# Patient Record
Sex: FEMALE | Race: BLACK OR AFRICAN AMERICAN | NOT HISPANIC OR LATINO | Employment: OTHER | URBAN - METROPOLITAN AREA
[De-identification: names, ages, dates, MRNs, and addresses within clinical notes are randomized per-mention and may not be internally consistent; named-entity substitution may affect disease eponyms.]

---

## 2023-02-28 ENCOUNTER — TELEPHONE (OUTPATIENT)
Dept: SMOKING CESSATION | Facility: CLINIC | Age: 38
End: 2023-02-28
Payer: COMMERCIAL

## 2023-03-01 ENCOUNTER — TELEPHONE (OUTPATIENT)
Dept: SMOKING CESSATION | Facility: CLINIC | Age: 38
End: 2023-03-01

## 2023-03-06 ENCOUNTER — TELEPHONE (OUTPATIENT)
Dept: SMOKING CESSATION | Facility: CLINIC | Age: 38
End: 2023-03-06

## 2023-03-16 PROBLEM — F33.1 MDD (MAJOR DEPRESSIVE DISORDER), RECURRENT EPISODE, MODERATE: Status: ACTIVE | Noted: 2023-03-16

## 2023-03-16 PROBLEM — F43.10 PTSD (POST-TRAUMATIC STRESS DISORDER): Status: ACTIVE | Noted: 2023-03-16

## 2023-03-16 PROBLEM — F10.20 ALCOHOL USE DISORDER, SEVERE, DEPENDENCE: Status: ACTIVE | Noted: 2023-03-16

## 2023-03-16 PROBLEM — S06.9XAA TBI (TRAUMATIC BRAIN INJURY): Chronic | Status: ACTIVE | Noted: 2023-03-16

## 2023-03-16 PROBLEM — F60.3 BORDERLINE PERSONALITY DISORDER IN ADULT: Status: ACTIVE | Noted: 2023-03-16

## 2023-03-16 PROBLEM — R41.3 MEMORY CHANGE: Status: ACTIVE | Noted: 2023-03-16

## 2023-03-16 PROBLEM — F12.10 CANNABIS ABUSE: Status: ACTIVE | Noted: 2023-03-16

## 2023-03-20 ENCOUNTER — SOCIAL WORK (OUTPATIENT)
Dept: ADMINISTRATIVE | Facility: OTHER | Age: 38
End: 2023-03-20
Payer: COMMERCIAL

## 2023-03-21 ENCOUNTER — SOCIAL WORK (OUTPATIENT)
Dept: ADMINISTRATIVE | Facility: OTHER | Age: 38
End: 2023-03-21
Payer: COMMERCIAL

## 2023-03-21 NOTE — PROGRESS NOTES
An[facility] Dion[facility] Bowden, [facility] ([phone][facility] regarding the counseling referral. A message was left requesting an update.    Ximena Pedro LCSW    [phone]

## 2023-03-22 ENCOUNTER — SOCIAL WORK (OUTPATIENT)
Dept: ADMINISTRATIVE | Facility: OTHER | Age: 38
End: 2023-03-22
Payer: COMMERCIAL

## 2023-03-22 NOTE — PROGRESS NOTES
Aniya received a phone call from Mr. Gallardo at [facility], [facility] Office ([phone]). He reports the counseling referral was received. He will contact Patient to schedule an assessment. Aniya verbalized appreciation.    Ximena Pedro [facility]    [phone]

## 2023-03-27 ENCOUNTER — SOCIAL WORK (OUTPATIENT)
Dept: ADMINISTRATIVE | Facility: OTHER | Age: 38
End: 2023-03-27
Payer: COMMERCIAL

## 2023-03-27 NOTE — PROGRESS NOTES
Aniya called Diony Bowden LCSW Office [phone]) to follow-up on the counseling referral. A voice message was left requesting a call back.    Ximena Pedro LCSW    [phone]

## 2023-03-29 ENCOUNTER — SOCIAL WORK (OUTPATIENT)
Dept: ADMINISTRATIVE | Facility: OTHER | Age: 38
End: 2023-03-29
Payer: COMMERCIAL

## 2023-03-29 NOTE — PROGRESS NOTES
Aniya called Diony Bowden LCSW office ([phone]). Mr. Gallardo reports he will have Patient scheduled for an assessment tomorrow. Aniya verbalized appreciation.    Diony Bowden LCSW  [address]  [place], [place]  [phone]  Appt:  3/30/2023     Ximena Pedro LCSW    [phone]

## 2024-03-20 PROBLEM — J45.990 EXERCISE-INDUCED ASTHMA: Status: ACTIVE | Noted: 2024-03-20

## 2024-03-20 PROBLEM — Z72.0 TOBACCO ABUSE: Status: ACTIVE | Noted: 2024-03-20

## 2024-04-15 ENCOUNTER — TELEPHONE (OUTPATIENT)
Dept: SMOKING CESSATION | Facility: CLINIC | Age: 39
End: 2024-04-15

## 2024-05-10 PROBLEM — Z72.0 TOBACCO ABUSE: Status: RESOLVED | Noted: 2024-03-20 | Resolved: 2024-05-10
